# Patient Record
Sex: FEMALE | NOT HISPANIC OR LATINO | URBAN - METROPOLITAN AREA
[De-identification: names, ages, dates, MRNs, and addresses within clinical notes are randomized per-mention and may not be internally consistent; named-entity substitution may affect disease eponyms.]

---

## 2018-02-08 ENCOUNTER — APPOINTMENT (RX ONLY)
Dept: URBAN - METROPOLITAN AREA CLINIC 167 | Facility: CLINIC | Age: 28
Setting detail: DERMATOLOGY
End: 2018-02-08

## 2018-02-08 DIAGNOSIS — L70.0 ACNE VULGARIS: ICD-10-CM

## 2018-02-08 DIAGNOSIS — D485 NEOPLASM OF UNCERTAIN BEHAVIOR OF SKIN: ICD-10-CM

## 2018-02-08 PROBLEM — D37.01 NEOPLASM OF UNCERTAIN BEHAVIOR OF LIP: Status: ACTIVE | Noted: 2018-02-08

## 2018-02-08 PROCEDURE — ? COUNSELING

## 2018-02-08 PROCEDURE — ? DEFER

## 2018-02-08 PROCEDURE — 99202 OFFICE O/P NEW SF 15 MIN: CPT

## 2018-02-08 PROCEDURE — ? TREATMENT REGIMEN

## 2018-02-08 PROCEDURE — ? PRESCRIPTION

## 2018-02-08 RX ORDER — TRETINOIN 0.8 MG/G
GEL TOPICAL
Qty: 1 | Refills: 3 | Status: ERX | COMMUNITY
Start: 2018-02-08

## 2018-02-08 RX ORDER — DAPSONE 75 MG/G
GEL TOPICAL
Qty: 1 | Refills: 3 | Status: ERX | COMMUNITY
Start: 2018-02-08

## 2018-02-08 RX ORDER — DOXYCYCLINE HYCLATE 120 MG/1
TABLET, DELAYED RELEASE ORAL
Qty: 30 | Refills: 3 | Status: ERX | COMMUNITY
Start: 2018-02-08

## 2018-02-08 RX ADMIN — TRETINOIN: 0.8 GEL TOPICAL at 22:27

## 2018-02-08 RX ADMIN — DAPSONE: 75 GEL TOPICAL at 22:28

## 2018-02-08 RX ADMIN — DOXYCYCLINE HYCLATE: 120 TABLET, DELAYED RELEASE ORAL at 22:30

## 2018-02-08 ASSESSMENT — LOCATION SIMPLE DESCRIPTION DERM
LOCATION SIMPLE: RIGHT LIP
LOCATION SIMPLE: LEFT CHEEK

## 2018-02-08 ASSESSMENT — LOCATION DETAILED DESCRIPTION DERM
LOCATION DETAILED: LEFT INFERIOR MEDIAL MALAR CHEEK
LOCATION DETAILED: RIGHT SUPERIOR VERMILION LIP

## 2018-02-08 ASSESSMENT — LOCATION ZONE DERM
LOCATION ZONE: FACE
LOCATION ZONE: LIP

## 2018-02-08 NOTE — PROCEDURE: COUNSELING
Dapsone Pregnancy And Lactation Text: This medication is Pregnancy Category C and is not considered safe during pregnancy or breast feeding.
Azithromycin Pregnancy And Lactation Text: This medication is considered safe during pregnancy and is also secreted in breast milk.
Tazorac Counseling:  Patient advised that medication is irritating and drying.  Patient may need to apply sparingly and wash off after an hour before eventually leaving it on overnight.  The patient verbalized understanding of the proper use and possible adverse effects of tazorac.  All of the patient's questions and concerns were addressed.
Isotretinoin Pregnancy And Lactation Text: This medication is Pregnancy Category X and is considered extremely dangerous during pregnancy. It is unknown if it is excreted in breast milk.
Erythromycin Pregnancy And Lactation Text: This medication is Pregnancy Category B and is considered safe during pregnancy. It is also excreted in breast milk.
Spironolactone Counseling: Patient advised regarding risks of diarrhea, abdominal pain, hyperkalemia, birth defects (for female patients), liver toxicity and renal toxicity. The patient may need blood work to monitor liver and kidney function and potassium levels while on therapy. The patient verbalized understanding of the proper use and possible adverse effects of spironolactone.  All of the patient's questions and concerns were addressed.
Detail Level: Zone
Topical Retinoid Pregnancy And Lactation Text: This medication is Pregnancy Category C. It is unknown if this medication is excreted in breast milk.
Topical Clindamycin Pregnancy And Lactation Text: This medication is Pregnancy Category B and is considered safe during pregnancy. It is unknown if it is excreted in breast milk.
Use Enhanced Medication Counseling?: No
Topical Retinoid counseling:  Patient advised to apply a pea-sized amount only at bedtime and wait 30 minutes after washing their face before applying.  If too drying, patient may add a non-comedogenic moisturizer. The patient verbalized understanding of the proper use and possible adverse effects of retinoids.  All of the patient's questions and concerns were addressed.
Azithromycin Counseling:  I discussed with the patient the risks of azithromycin including but not limited to GI upset, allergic reaction, drug rash, diarrhea, and yeast infections.
Benzoyl Peroxide Counseling: Patient counseled that medicine may cause skin irritation and bleach clothing.  In the event of skin irritation, the patient was advised to reduce the amount of the drug applied or use it less frequently.   The patient verbalized understanding of the proper use and possible adverse effects of benzoyl peroxide.  All of the patient's questions and concerns were addressed.
Benzoyl Peroxide Pregnancy And Lactation Text: This medication is Pregnancy Category C. It is unknown if benzoyl peroxide is excreted in breast milk.
Doxycycline Pregnancy And Lactation Text: This medication is Pregnancy Category D and not consider safe during pregnancy. It is also excreted in breast milk but is considered safe for shorter treatment courses.
High Dose Vitamin A Pregnancy And Lactation Text: High dose vitamin A therapy is contraindicated during pregnancy and breast feeding.
Tetracycline Counseling: Patient counseled regarding possible photosensitivity and increased risk for sunburn.  Patient instructed to avoid sunlight, if possible.  When exposed to sunlight, patients should wear protective clothing, sunglasses, and sunscreen.  The patient was instructed to call the office immediately if the following severe adverse effects occur:  hearing changes, easy bruising/bleeding, severe headache, or vision changes.  The patient verbalized understanding of the proper use and possible adverse effects of tetracycline.  All of the patient's questions and concerns were addressed. Patient understands to avoid pregnancy while on therapy due to potential birth defects.
Topical Clindamycin Counseling: Patient counseled that this medication may cause skin irritation or allergic reactions.  In the event of skin irritation, the patient was advised to reduce the amount of the drug applied or use it less frequently.   The patient verbalized understanding of the proper use and possible adverse effects of clindamycin.  All of the patient's questions and concerns were addressed.
Topical Sulfur Applications Counseling: Topical Sulfur Counseling: Patient counseled that this medication may cause skin irritation or allergic reactions.  In the event of skin irritation, the patient was advised to reduce the amount of the drug applied or use it less frequently.   The patient verbalized understanding of the proper use and possible adverse effects of topical sulfur application.  All of the patient's questions and concerns were addressed.
Minocycline Pregnancy And Lactation Text: This medication is Pregnancy Category D and not consider safe during pregnancy. It is also excreted in breast milk.
Birth Control Pills Counseling: Birth Control Pill Counseling: I discussed with the patient the potential side effects of OCPs including but not limited to increased risk of stroke, heart attack, thrombophlebitis, deep venous thrombosis, hepatic adenomas, breast changes, GI upset, headaches, and depression.  The patient verbalized understanding of the proper use and possible adverse effects of OCPs. All of the patient's questions and concerns were addressed.
Doxycycline Counseling:  Patient counseled regarding possible photosensitivity and increased risk for sunburn.  Patient instructed to avoid sunlight, if possible.  When exposed to sunlight, patients should wear protective clothing, sunglasses, and sunscreen.  The patient was instructed to call the office immediately if the following severe adverse effects occur:  hearing changes, easy bruising/bleeding, severe headache, or vision changes.  The patient verbalized understanding of the proper use and possible adverse effects of doxycycline.  All of the patient's questions and concerns were addressed.
Birth Control Pills Pregnancy And Lactation Text: This medication should be avoided if pregnant and for the first 30 days post-partum.
Topical Sulfur Applications Pregnancy And Lactation Text: This medication is Pregnancy Category C and has an unknown safety profile during pregnancy. It is unknown if this topical medication is excreted in breast milk.
Dapsone Counseling: I discussed with the patient the risks of dapsone including but not limited to hemolytic anemia, agranulocytosis, rashes, methemoglobinemia, kidney failure, peripheral neuropathy, headaches, GI upset, and liver toxicity.  Patients who start dapsone require monitoring including baseline LFTs and weekly CBCs for the first month, then every month thereafter.  The patient verbalized understanding of the proper use and possible adverse effects of dapsone.  All of the patient's questions and concerns were addressed.
Spironolactone Pregnancy And Lactation Text: This medication can cause feminization of the male fetus and should be avoided during pregnancy. The active metabolite is also found in breast milk.
Bactrim Pregnancy And Lactation Text: This medication is Pregnancy Category D and is known to cause fetal risk.  It is also excreted in breast milk.
Isotretinoin Counseling: Patient should get monthly blood tests, not donate blood, not drive at night if vision affected, not share medication, and not undergo elective surgery for 6 months after tx completed. Side effects reviewed, pt to contact office should one occur.
Erythromycin Counseling:  I discussed with the patient the risks of erythromycin including but not limited to GI upset, allergic reaction, drug rash, diarrhea, increase in liver enzymes, and yeast infections.
High Dose Vitamin A Counseling: Side effects reviewed, pt to contact office should one occur.
Tazorac Pregnancy And Lactation Text: This medication is not safe during pregnancy. It is unknown if this medication is excreted in breast milk.
Minocycline Counseling: Patient advised regarding possible photosensitivity and discoloration of the teeth, skin, lips, tongue and gums.  Patient instructed to avoid sunlight, if possible.  When exposed to sunlight, patients should wear protective clothing, sunglasses, and sunscreen.  The patient was instructed to call the office immediately if the following severe adverse effects occur:  hearing changes, easy bruising/bleeding, severe headache, or vision changes.  The patient verbalized understanding of the proper use and possible adverse effects of minocycline.  All of the patient's questions and concerns were addressed.
Bactrim Counseling:  I discussed with the patient the risks of sulfa antibiotics including but not limited to GI upset, allergic reaction, drug rash, diarrhea, dizziness, photosensitivity, and yeast infections.  Rarely, more serious reactions can occur including but not limited to aplastic anemia, agranulocytosis, methemoglobinemia, blood dyscrasias, liver or kidney failure, lung infiltrates or desquamative/blistering drug rashes.

## 2018-09-27 NOTE — PROCEDURE: TREATMENT REGIMEN
Date and Time: 09/27/18  0803





Subjective Assessment: 





patient feeling better, insulin drip is off. anion gap is closed. no more 

nausea or vomiting, tolerating liquids. still has some headache but otherwise 

better





Objective Exam


General Appearance: no apparent distress, alert


Skin Exam: normal color, warm, dry


Eye Exam: PERRL, EOMI, eyes nml inspection


Respiratory Exam: normal breath sounds, lungs clear, No respiratory distress


Cardiovascular Exam: regular rate/rhythm, normal heart sounds


Gastrointestinal/Abdomen Exam: soft, No tenderness, No mass


Back Exam: normal inspection, normal range of motion, No CVA tenderness, No 

vertebral tenderness





OBJECTIVE DATA


Vital Signs: 


 Vital Signs - 24 hr











  Temp Pulse Resp BP Pulse Ox


 


 09/27/18 06:54   67  20  107/69  96


 


 09/27/18 06:00   69  16  107/69  98


 


 09/27/18 05:00   71  16  91/54  98


 


 09/27/18 04:00  98.8 F  68  13  91/62  97


 


 09/27/18 03:00   67  16  101/61  97


 


 09/27/18 02:00   79  17  94/63  95


 


 09/27/18 01:00   72  21  94/63  98


 


 09/27/18 00:00  98.7 F  77  20  94/66  98


 


 09/26/18 23:00   76  19  100/66  96


 


 09/26/18 22:00   80  17  105/68  98


 


 09/26/18 21:00   82  19  104/69  98


 


 09/26/18 20:00  98 F  83  15  111/75  99


 


 09/26/18 19:00   85  14  106/66  99


 


 09/26/18 18:00   95 H  21  144/72  98


 


 09/26/18 16:59   89  15  111/75  98


 


 09/26/18 16:00   88  17  120/73  100


 


 09/26/18 11:54  98.5 F  104 H  18  110/60  99


 


 09/26/18 10:56      98


 


 09/26/18 09:16  98 F  88  18  130/76  99


 


 09/26/18 09:13  98.0 F  88  18  130/76  99


 


 09/26/18 08:47   84  16   99








 Pain Assessment - Last Documented











Pain Intensity                 8


 


Pain Scale Used                0-10 Pain Scale











Intake and Output: 


 Intake & Output











 09/24/18 09/25/18 09/26/18 09/27/18





 11:59 11:59 11:59 11:59


 


Intake Total    5883


 


Output Total   300 2100


 


Balance   -300 3783


 


Weight   80.8 kg 86.1 kg











Lab Results: 


 Accuchecks











Date                           09/27/18


 


Date                           09/27/18


 


Date                           09/27/18


 


Date                           09/27/18


 


Date                           09/27/18


 


Date                           09/27/18


 


Date                           09/27/18


 


Date                           09/27/18


 


Date                           09/26/18


 


Date                           09/26/18


 


Date                           09/26/18


 


Date                           09/26/18


 


Date                           09/26/18


 


Date                           09/26/18


 


Date                           09/26/18


 


Date                           09/26/18


 


Date                           09/26/18


 


Date                           09/26/18


 


Date                           09/26/18


 


Time                           06:27


 


Time                           06:27


 


Time                           06:00


 


Time                           04:00


 


Time                           03:00


 


Time                           02:00


 


Time                           01:00


 


Time                           00:00


 


Time                           23:00


 


Time                           22:00


 


Time                           20:00


 


Time                           20:00


 


Time                           19:07


 


Time                           18:03


 


Time                           16:59


 


Time                           16:00


 


Time                           15:30


 


Time                           13:00


 


Time                           10:50


 


Accucheck Value:               128


 


Accucheck Value:               74


 


Accucheck Value:               59


 


Accucheck Value:               104


 


Accucheck Value:               150


 


Accucheck Value:               120


 


Accucheck Value:               146


 


Accucheck Value:               142


 


Accucheck Value:               323


 


Accucheck Value:               174


 


Accucheck Value:               223


 


Accucheck Value:               242


 


Accucheck Value:               221


 


Accucheck Value:               207


 


Accucheck Value:               315


 


Accucheck Value:               253


 


Accucheck Value:               268


 


Accucheck Value:               323


 


Accucheck Value:               284


 


Accucheck Value:               268











 Lab Results-Last 24 Hours











  09/26/18 09/26/18 09/26/18 Range/Units





  13:52 17:58 17:58 


 


WBC     (4.0-10.5)  K/mm3


 


RBC     (4.1-5.6)  M/mm3


 


Hgb     (12.5-18.0)  gm/dl


 


Hct     (42-50)  %


 


MCV     ()  fl


 


MCH     (26-32)  pg


 


MCHC     (32-36)  g/dl


 


RDW     (11.5-14.0)  %


 


Plt Count     (150-450)  K/mm3


 


MPV     (6-9.5)  fl


 


Gran %     (36.0-66.0)  %


 


Eos # (Auto)     (0-0.5)  


 


Absolute Lymphs (auto)     (1.0-4.6)  


 


Absolute Monos (auto)     (0.0-1.3)  


 


Lymphocytes %     (24.0-44.0)  %


 


Monocytes %     (0.0-12.0)  %


 


Eosinophils %     (0.00-5.0)  %


 


Basophils %     (0.0-0.4)  %


 


Absolute Granulocytes     (1.4-6.9)  


 


Basophils #     (0-0.4)  


 


Sodium  139  139   (137-145)  mmol/L


 


Potassium  6.9 H*  5.0   (3.5-5.1)  mmol/L


 


Chloride  106  109 H   ()  mmol/L


 


Carbon Dioxide  < 5 L*  8 L*   (22-30)  mmol/L


 


Anion Gap  Not Reportable  27.4 H   


 


BUN  23 H  21 H   (9-20)  mg/dL


 


Creatinine  1.26 H  1.18   (0.66-1.25)  mg/dL


 


Estimated GFR  > 60.0  > 60.0   ML/MIN


 


Glucose  303 H  208 H   ()  mg/dL


 


Hemoglobin A1c     (4.5-6.0)  %


 


Calcium  8.9  8.9   (8.4-10.2)  mg/dL


 


Phosphorus    4.4  (2.5-4.5)  mg/dL


 


Magnesium    2.7 H  (1.6-2.3)  mg/dL


 


Total Bilirubin     (0.2-1.3)  mg/dL


 


AST     (17-59)  U/L


 


ALT     (0-50)  U/L


 


Alkaline Phosphatase     ()  U/L


 


Serum Total Protein     (6.3-8.2)  g/dL


 


Albumin     (3.5-5.0)  g/dL














  09/26/18 09/27/18 09/27/18 Range/Units





  22:00 02:00 02:10 


 


WBC    10.7 H  (4.0-10.5)  K/mm3


 


RBC    4.49  (4.1-5.6)  M/mm3


 


Hgb    14.5  (12.5-18.0)  gm/dl


 


Hct    40.1 L  (42-50)  %


 


MCV    89.3  ()  fl


 


MCH    32.3 H  (26-32)  pg


 


MCHC    36.2 H  (32-36)  g/dl


 


RDW    11.8  (11.5-14.0)  %


 


Plt Count    202  (150-450)  K/mm3


 


MPV    11.0 H  (6-9.5)  fl


 


Gran %    71.0 H  (36.0-66.0)  %


 


Eos # (Auto)    0.02  (0-0.5)  


 


Absolute Lymphs (auto)    2.36  (1.0-4.6)  


 


Absolute Monos (auto)    0.72  (0.0-1.3)  


 


Lymphocytes %    22.0 L  (24.0-44.0)  %


 


Monocytes %    6.7  (0.0-12.0)  %


 


Eosinophils %    0.2  (0.00-5.0)  %


 


Basophils %    0.1  (0.0-0.4)  %


 


Absolute Granulocytes    7.62 H  (1.4-6.9)  


 


Basophils #    0.01  (0-0.4)  


 


Sodium  135 L    (137-145)  mmol/L


 


Potassium  3.8    (3.5-5.1)  mmol/L


 


Chloride  108 H    ()  mmol/L


 


Carbon Dioxide  13 L*    (22-30)  mmol/L


 


Anion Gap  17.3 H    


 


BUN  18    (9-20)  mg/dL


 


Creatinine  0.89    (0.66-1.25)  mg/dL


 


Estimated GFR  > 60.0    ML/MIN


 


Glucose  187 H    ()  mg/dL


 


Hemoglobin A1c     (4.5-6.0)  %


 


Calcium  8.7    (8.4-10.2)  mg/dL


 


Phosphorus   2.0 L   (2.5-4.5)  mg/dL


 


Magnesium   2.2   (1.6-2.3)  mg/dL


 


Total Bilirubin     (0.2-1.3)  mg/dL


 


AST     (17-59)  U/L


 


ALT     (0-50)  U/L


 


Alkaline Phosphatase     ()  U/L


 


Serum Total Protein     (6.3-8.2)  g/dL


 


Albumin     (3.5-5.0)  g/dL














  09/27/18 09/27/18 Range/Units





  02:10 02:10 


 


WBC    (4.0-10.5)  K/mm3


 


RBC    (4.1-5.6)  M/mm3


 


Hgb    (12.5-18.0)  gm/dl


 


Hct    (42-50)  %


 


MCV    ()  fl


 


MCH    (26-32)  pg


 


MCHC    (32-36)  g/dl


 


RDW    (11.5-14.0)  %


 


Plt Count    (150-450)  K/mm3


 


MPV    (6-9.5)  fl


 


Gran %    (36.0-66.0)  %


 


Eos # (Auto)    (0-0.5)  


 


Absolute Lymphs (auto)    (1.0-4.6)  


 


Absolute Monos (auto)    (0.0-1.3)  


 


Lymphocytes %    (24.0-44.0)  %


 


Monocytes %    (0.0-12.0)  %


 


Eosinophils %    (0.00-5.0)  %


 


Basophils %    (0.0-0.4)  %


 


Absolute Granulocytes    (1.4-6.9)  


 


Basophils #    (0-0.4)  


 


Sodium  136 L   (137-145)  mmol/L


 


Potassium  3.6   (3.5-5.1)  mmol/L


 


Chloride  108 H   ()  mmol/L


 


Carbon Dioxide  18 L   (22-30)  mmol/L


 


Anion Gap  13.4   


 


BUN  17   (9-20)  mg/dL


 


Creatinine  0.90   (0.66-1.25)  mg/dL


 


Estimated GFR  > 60.0   ML/MIN


 


Glucose  126 H   ()  mg/dL


 


Hemoglobin A1c   8.23 H  (4.5-6.0)  %


 


Calcium  8.7   (8.4-10.2)  mg/dL


 


Phosphorus    (2.5-4.5)  mg/dL


 


Magnesium    (1.6-2.3)  mg/dL


 


Total Bilirubin  0.30   (0.2-1.3)  mg/dL


 


AST  12 L   (17-59)  U/L


 


ALT  13   (0-50)  U/L


 


Alkaline Phosphatase  96   ()  U/L


 


Serum Total Protein  6.8   (6.3-8.2)  g/dL


 


Albumin  3.9   (3.5-5.0)  g/dL














Assessment/Plan


(1) Diabetic ketoacidosis


Current Visit: Yes   Status: Acute   


Qualifiers: 


   Diabetes mellitus type: type 2   Diabetes mellitus complication detail: 

without coma   Qualified Code(s): E11.10 - Type 2 diabetes mellitus with 

ketoacidosis without coma   


Assessment & Plan: 


continue long acting basaglar, increased to 15 units bid, will add 5 units 

scheduled novolog with meals and observe. continue IV fluids


Code(s): E13.10 - OTH DIABETES MELLITUS WITH KETOACIDOSIS WITHOUT COMA   





(2) Vomiting


Current Visit: Yes   Status: Acute   Code(s): R11.10 - VOMITING, UNSPECIFIED
Initiate Treatment: Retin a micro .08% gel every night at bedtime \\nAczone cream every morning to face.\\nDoryx 120mg daily
Detail Level: Zone
Plan: consider OCP due to hormonal influence- pt to discuss w/ PCP\\nLow threshold for isotretinoin in future due to significant comedonal>>inflammatory lesions\\nOTC probiotics daily